# Patient Record
Sex: MALE | Race: WHITE | Employment: UNEMPLOYED | URBAN - METROPOLITAN AREA
[De-identification: names, ages, dates, MRNs, and addresses within clinical notes are randomized per-mention and may not be internally consistent; named-entity substitution may affect disease eponyms.]

---
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
## 2019-02-12 ENCOUNTER — OFFICE VISIT (OUTPATIENT)
Dept: INTERNAL MEDICINE CLINIC | Facility: CLINIC | Age: 19
End: 2019-02-12
Payer: COMMERCIAL

## 2019-02-12 VITALS
WEIGHT: 160 LBS | RESPIRATION RATE: 16 BRPM | SYSTOLIC BLOOD PRESSURE: 122 MMHG | HEART RATE: 82 BPM | HEIGHT: 70 IN | DIASTOLIC BLOOD PRESSURE: 78 MMHG | BODY MASS INDEX: 22.9 KG/M2

## 2019-02-12 DIAGNOSIS — F32.A DEPRESSION, UNSPECIFIED DEPRESSION TYPE: Primary | ICD-10-CM

## 2019-02-12 PROCEDURE — 99204 OFFICE O/P NEW MOD 45 MIN: CPT | Performed by: INTERNAL MEDICINE

## 2019-02-12 PROCEDURE — 99212 OFFICE O/P EST SF 10 MIN: CPT | Performed by: INTERNAL MEDICINE

## 2019-02-12 RX ORDER — ESCITALOPRAM OXALATE 5 MG/1
5 TABLET ORAL DAILY
Qty: 30 TABLET | Refills: 2 | Status: SHIPPED | OUTPATIENT
Start: 2019-02-12 | End: 2019-09-30

## 2019-02-12 NOTE — ASSESSMENT & PLAN NOTE
Mild to moderate persistent depression–waxing and waning over the past 1 year. He seems to be comfortable with his current educational choices. He does have a social [place] that he is comfortable with. No significant recreational drug uses.   Labs as dis

## 2019-02-12 NOTE — PATIENT INSTRUCTIONS
Problem List Items Addressed This Visit        Unprioritized    Depression - Primary     Mild to moderate persistent depression–waxing and waning over the past 1 year. He seems to be comfortable with his current educational choices.   He does have a social

## 2019-02-12 NOTE — PROGRESS NOTES
HPI:    Patient ID: Jaylen Hewitt is a 19year old male. New pt.   Establish care          Depression   Visit Type: initial  Onset of symptoms: more than 1 year ago  Progression since onset: gradually worsening  Patient presents with the following sympto Mouth/Throat: Oropharynx is clear and moist. No oropharyngeal exudate. Eyes: Conjunctivae and EOM are normal. Pupils are equal, round, and reactive to light. Neck: Normal range of motion. Neck supple. No JVD present. No thyromegaly present.    Cardiov Encounter      CBC W Differential W Platelet [E]      Comp Metabolic Panel (14) [E]      TSH [E]      Vitamin B12 [E]      Urinalysis, Routine [E]      Vitamin D, 25-Hydroxy [E]      Meds This Visit:  Requested Prescriptions     Signed Prescriptions Disp R

## 2019-09-30 ENCOUNTER — LAB ENCOUNTER (OUTPATIENT)
Dept: LAB | Age: 19
End: 2019-09-30
Attending: INTERNAL MEDICINE
Payer: COMMERCIAL

## 2019-09-30 ENCOUNTER — OFFICE VISIT (OUTPATIENT)
Dept: INTERNAL MEDICINE CLINIC | Facility: CLINIC | Age: 19
End: 2019-09-30
Payer: COMMERCIAL

## 2019-09-30 VITALS
BODY MASS INDEX: 22.62 KG/M2 | WEIGHT: 158 LBS | SYSTOLIC BLOOD PRESSURE: 131 MMHG | HEIGHT: 70 IN | HEART RATE: 56 BPM | RESPIRATION RATE: 16 BRPM | DIASTOLIC BLOOD PRESSURE: 85 MMHG

## 2019-09-30 DIAGNOSIS — R59.0 LAD (LYMPHADENOPATHY), INGUINAL: Primary | ICD-10-CM

## 2019-09-30 DIAGNOSIS — R59.0 LAD (LYMPHADENOPATHY), INGUINAL: ICD-10-CM

## 2019-09-30 PROCEDURE — 85652 RBC SED RATE AUTOMATED: CPT

## 2019-09-30 PROCEDURE — 85025 COMPLETE CBC W/AUTO DIFF WBC: CPT

## 2019-09-30 PROCEDURE — 87591 N.GONORRHOEAE DNA AMP PROB: CPT

## 2019-09-30 PROCEDURE — 81003 URINALYSIS AUTO W/O SCOPE: CPT

## 2019-09-30 PROCEDURE — 87086 URINE CULTURE/COLONY COUNT: CPT

## 2019-09-30 PROCEDURE — 99214 OFFICE O/P EST MOD 30 MIN: CPT | Performed by: INTERNAL MEDICINE

## 2019-09-30 PROCEDURE — 87491 CHLMYD TRACH DNA AMP PROBE: CPT

## 2019-09-30 PROCEDURE — 36415 COLL VENOUS BLD VENIPUNCTURE: CPT

## 2019-09-30 RX ORDER — DOXYCYCLINE HYCLATE 100 MG
100 TABLET ORAL 2 TIMES DAILY
Qty: 20 TABLET | Refills: 0 | Status: SHIPPED | OUTPATIENT
Start: 2019-09-30 | End: 2021-08-12

## 2019-09-30 NOTE — PROGRESS NOTES
HPI:    Patient ID: Odalis Cao is a 22year old male. Testicular Swelling   The patient's primary symptoms include pelvic pain and scrotal swelling (especially in the groin).  The patient's pertinent negatives include no genital injury, genital itchin index is 22.67 kg/m². PHYSICAL EXAM:   Physical Exam   Constitutional: He is oriented to person, place, and time. He appears well-developed and well-nourished. Cardiovascular: Normal rate, regular rhythm, normal heart sounds and intact distal pulses. avoid exposure to the sun. Advised to take the medication after a meal with a full glass of water. Follow-up evaluation in the next 10 days.          Relevant Medications    Doxycycline Hyclate 100 MG Oral Tab    Other Relevant Orders    CHLAMYDIA/GONOCOC

## 2019-09-30 NOTE — ASSESSMENT & PLAN NOTE
Left inguinal mildly tender lymphadenopathy noticed about 2 to 3 days back. No associated fevers or chills. No penile discharge or sexually transmitted diseases.   No significant testicular swelling noted however some palpable discomfort in the epididymis

## 2019-09-30 NOTE — PATIENT INSTRUCTIONS
Problem List Items Addressed This Visit        Unprioritized    LAD (lymphadenopathy), inguinal - Primary     Left inguinal mildly tender lymphadenopathy noticed about 2 to 3 days back. No associated fevers or chills.   No penile discharge or sexually rojas

## 2019-10-10 ENCOUNTER — OFFICE VISIT (OUTPATIENT)
Dept: INTERNAL MEDICINE CLINIC | Facility: CLINIC | Age: 19
End: 2019-10-10
Payer: COMMERCIAL

## 2019-10-10 ENCOUNTER — HOSPITAL ENCOUNTER (OUTPATIENT)
Dept: ULTRASOUND IMAGING | Facility: HOSPITAL | Age: 19
Discharge: HOME | End: 2019-10-10
Attending: INTERNAL MEDICINE
Payer: COMMERCIAL

## 2019-10-10 VITALS
WEIGHT: 158.38 LBS | RESPIRATION RATE: 20 BRPM | BODY MASS INDEX: 22.67 KG/M2 | SYSTOLIC BLOOD PRESSURE: 123 MMHG | TEMPERATURE: 99 F | DIASTOLIC BLOOD PRESSURE: 77 MMHG | HEART RATE: 63 BPM | HEIGHT: 70 IN

## 2019-10-10 DIAGNOSIS — R59.0 LAD (LYMPHADENOPATHY), INGUINAL: Primary | ICD-10-CM

## 2019-10-10 DIAGNOSIS — R59.0 LAD (LYMPHADENOPATHY), INGUINAL: ICD-10-CM

## 2019-10-10 DIAGNOSIS — Z23 NEED FOR VACCINATION: ICD-10-CM

## 2019-10-10 PROCEDURE — 99214 OFFICE O/P EST MOD 30 MIN: CPT | Performed by: INTERNAL MEDICINE

## 2019-10-10 PROCEDURE — 90686 IIV4 VACC NO PRSV 0.5 ML IM: CPT | Performed by: INTERNAL MEDICINE

## 2019-10-10 PROCEDURE — 90471 IMMUNIZATION ADMIN: CPT | Performed by: INTERNAL MEDICINE

## 2019-10-10 PROCEDURE — 76870 US EXAM SCROTUM: CPT | Performed by: INTERNAL MEDICINE

## 2019-10-10 PROCEDURE — 93975 VASCULAR STUDY: CPT | Performed by: INTERNAL MEDICINE

## 2019-10-10 NOTE — PROGRESS NOTES
HPI:    Patient ID: Henrik Dickinson is a 22year old male. FINDINGS:     RIGHT:  TESTICLE:        4.60 x 2.76 x 3.15 cm. Normal echogenicity. No masses. EPIDIDYMIS:     Normal size and echogenicity. OTHER:             Small hydrocele. No varicocele. dysuria, flank pain, joint pain, painful intercourse, sore throat or urgency. Associated symptoms comments: Sexually active, single partner. No history of STDs. Labs negative for STDs . The testicular pain affects the left testicle.  There is swelling i submandibular, no tonsillar, no preauricular, no posterior auricular and no occipital adenopathy present. He has no cervical adenopathy. He has no axillary adenopathy.         Right: No inguinal, no supraclavicular and no epitrochlear adenopathy pre

## 2019-10-10 NOTE — PATIENT INSTRUCTIONS
Problem List Items Addressed This Visit        Unprioritized    LAD (lymphadenopathy), inguinal - Primary     Left inguinal lymph node about 1.23 cm per the ultrasound. Currently no significant pain. Swelling is almost completely resolved.   He does have

## 2019-10-10 NOTE — ASSESSMENT & PLAN NOTE
Left inguinal lymph node about 1.23 cm per the ultrasound. Currently no significant pain. Swelling is almost completely resolved. He does have small epididymal cysts as well as small bilateral hydroceles.   Urine cultures, STD tests have all been negativ

## 2019-11-28 ENCOUNTER — HOSPITAL ENCOUNTER (EMERGENCY)
Facility: HOSPITAL | Age: 19
Discharge: HOME | End: 2019-11-28
Attending: EMERGENCY MEDICINE
Payer: COMMERCIAL

## 2019-11-28 ENCOUNTER — APPOINTMENT (OUTPATIENT)
Dept: GENERAL RADIOLOGY | Facility: HOSPITAL | Age: 19
End: 2019-11-28
Attending: EMERGENCY MEDICINE
Payer: COMMERCIAL

## 2019-11-28 VITALS
WEIGHT: 155 LBS | OXYGEN SATURATION: 98 % | TEMPERATURE: 99 F | RESPIRATION RATE: 18 BRPM | DIASTOLIC BLOOD PRESSURE: 88 MMHG | HEIGHT: 70 IN | SYSTOLIC BLOOD PRESSURE: 135 MMHG | HEART RATE: 98 BPM | BODY MASS INDEX: 22.19 KG/M2

## 2019-11-28 DIAGNOSIS — S00.33XA CONTUSION OF NOSE, INITIAL ENCOUNTER: Primary | ICD-10-CM

## 2019-11-28 PROCEDURE — 99283 EMERGENCY DEPT VISIT LOW MDM: CPT

## 2019-11-28 PROCEDURE — 70160 X-RAY EXAM OF NASAL BONES: CPT | Performed by: EMERGENCY MEDICINE

## 2019-11-28 RX ORDER — IBUPROFEN 600 MG/1
600 TABLET ORAL ONCE
Status: COMPLETED | OUTPATIENT
Start: 2019-11-28 | End: 2019-11-28

## 2019-11-28 NOTE — ED INITIAL ASSESSMENT (HPI)
States was hit on nose with another player's arm while playing football this AM, denies LOC. Denies epistaxis.

## 2019-11-30 NOTE — ED PROVIDER NOTES
Patient Seen in: [facility] Emergency Department      History   Patient presents with:  Trauma (cardiovascular, musculoskeletal)    Stated Complaint: Arm to nose, no blood     HPI    23-year-old male presents for evaluation of facial trauma.   Sandra Normocephalic. Comments: Swelling to nasal bridge, no septal hematoma bilaterally  Eyes:      Conjunctiva/sclera: Conjunctivae normal.   Neck:      Musculoskeletal: Normal range of motion and neck supple.    Cardiovascular:      Rate and Rhythm: Normal all ED vitals. MDM     Discussed imaging results with the patient as well as recommendations for supportive care. Patient has seen ENT for previous nasal fracture, recommend outpatient follow-up with any healing concerns.   Patient and mom verbali

## 2020-01-19 ENCOUNTER — HOSPITAL ENCOUNTER (OUTPATIENT)
Age: 20
Discharge: HOME | End: 2020-01-19
Payer: COMMERCIAL

## 2020-01-19 ENCOUNTER — APPOINTMENT (OUTPATIENT)
Dept: GENERAL RADIOLOGY | Age: 20
End: 2020-01-19
Attending: NURSE PRACTITIONER
Payer: COMMERCIAL

## 2020-01-19 VITALS
BODY MASS INDEX: 22.19 KG/M2 | TEMPERATURE: 98 F | HEART RATE: 62 BPM | OXYGEN SATURATION: 100 % | RESPIRATION RATE: 16 BRPM | DIASTOLIC BLOOD PRESSURE: 74 MMHG | HEIGHT: 70 IN | SYSTOLIC BLOOD PRESSURE: 135 MMHG | WEIGHT: 155 LBS

## 2020-01-19 DIAGNOSIS — S90.122A CONTUSION OF LESSER TOE OF LEFT FOOT WITHOUT DAMAGE TO NAIL, INITIAL ENCOUNTER: Primary | ICD-10-CM

## 2020-01-19 PROCEDURE — 99213 OFFICE O/P EST LOW 20 MIN: CPT

## 2020-01-19 PROCEDURE — 73660 X-RAY EXAM OF TOE(S): CPT | Performed by: NURSE PRACTITIONER

## 2020-01-19 NOTE — ED INITIAL ASSESSMENT (HPI)
PATIENT ARRIVED AMBULATORY TO ROOM C/O PAIN TO THE 2ND DIGIT ON THE LEFT FOOT. PATIENT STATES \"I WAS DRINKING WITH MY FRIENDS YESTERDAY AND I HIT MY TOE ON SOMETHING BUT I DON'T REMEMBER WHAT HAPPENED\" SLIGHT SWELLING TO THE TOE.

## 2020-01-19 NOTE — ED PROVIDER NOTES
Patient presents with: Toe Pain      HPI:     Barbara Long is a 22year old male with no significant past medical history presents with chief complaint of swelling, ecchymosis and pain to the second digit of the left foot.   Patient states he was at his f TOE(S) (MIN 2 VIEWS), LEFT 2ND (CPT=73660) Once          Order Comments:              L foot pain  PATIENT ARRIVED AMBULATORY TO ROOM C/O PAIN TO THE 2ND DIGIT ON THE LEFT               FOOT.  PATIENT STATES \"I WAS DRINKING WITH MY FRIENDS YESTERDAY AND I

## 2020-02-12 ENCOUNTER — PATIENT MESSAGE (OUTPATIENT)
Dept: INTERNAL MEDICINE CLINIC | Facility: CLINIC | Age: 20
End: 2020-02-12

## 2020-02-13 ENCOUNTER — TELEPHONE (OUTPATIENT)
Dept: OTHER | Age: 20
End: 2020-02-13

## 2020-02-13 NOTE — TELEPHONE ENCOUNTER
From: Ori Santiago  To: Patrick Loya MD  Sent: 2/12/2020 1:59 PM CST  Subject: Prescription Question    I had been prescribed Lexapro 5mg and I am in need of additional medication as the 5mg did not make a significant difference for me.  I would like to a

## 2020-02-13 NOTE — TELEPHONE ENCOUNTER
LMTCB and [employer] message sent as well. Please see e-mail below, need to triage.               Bentley Drew   to Edin Gaviria MD           1:59 PM   I had been prescribed Lexapro 5mg and I am in need of additional medication as the 5mg did not make a si

## 2020-02-24 ENCOUNTER — HOSPITAL ENCOUNTER (EMERGENCY)
Facility: HOSPITAL | Age: 20
Discharge: HOME | End: 2020-02-24
Attending: EMERGENCY MEDICINE
Payer: COMMERCIAL

## 2020-02-24 VITALS
RESPIRATION RATE: 18 BRPM | HEART RATE: 91 BPM | DIASTOLIC BLOOD PRESSURE: 88 MMHG | OXYGEN SATURATION: 99 % | SYSTOLIC BLOOD PRESSURE: 140 MMHG | TEMPERATURE: 98 F

## 2020-02-24 DIAGNOSIS — F32.A DEPRESSION, UNSPECIFIED DEPRESSION TYPE: Primary | ICD-10-CM

## 2020-02-24 LAB
AMPHET UR QL SCN: NEGATIVE
ANION GAP SERPL CALC-SCNC: 6 MMOL/L (ref 0–18)
BASOPHILS # BLD AUTO: 0.03 X10(3) UL (ref 0–0.2)
BASOPHILS NFR BLD AUTO: 0.6 %
BILIRUB UR QL: NEGATIVE
BUN BLD-MCNC: 16 MG/DL (ref 7–18)
BUN/CREAT SERPL: 13.8 (ref 10–20)
CALCIUM BLD-MCNC: 9.7 MG/DL (ref 8.5–10.1)
CANNABINOIDS UR QL SCN: NEGATIVE
CHLORIDE SERPL-SCNC: 104 MMOL/L (ref 98–112)
CLARITY UR: CLEAR
CO2 SERPL-SCNC: 28 MMOL/L (ref 21–32)
COCAINE UR QL: NEGATIVE
COLOR UR: YELLOW
CREAT BLD-MCNC: 1.16 MG/DL (ref 0.7–1.3)
DEPRECATED RDW RBC AUTO: 36.5 FL (ref 35.1–46.3)
EOSINOPHIL # BLD AUTO: 0.07 X10(3) UL (ref 0–0.7)
EOSINOPHIL NFR BLD AUTO: 1.3 %
ERYTHROCYTE [DISTWIDTH] IN BLOOD BY AUTOMATED COUNT: 11.7 % (ref 11–15)
ETHANOL SERPL-MCNC: <3 MG/DL (ref ?–3)
GLUCOSE BLD-MCNC: 88 MG/DL (ref 70–99)
GLUCOSE UR-MCNC: NEGATIVE MG/DL
HCT VFR BLD AUTO: 45.9 % (ref 39–53)
HGB BLD-MCNC: 16 G/DL (ref 13–17.5)
HYALINE CASTS #/AREA URNS AUTO: 8 /LPF
IMM GRANULOCYTES # BLD AUTO: 0.02 X10(3) UL (ref 0–1)
IMM GRANULOCYTES NFR BLD: 0.4 %
KETONES UR-MCNC: 80 MG/DL
LEUKOCYTE ESTERASE UR QL STRIP.AUTO: NEGATIVE
LYMPHOCYTES # BLD AUTO: 2.45 X10(3) UL (ref 1–4)
LYMPHOCYTES NFR BLD AUTO: 45 %
MCH RBC QN AUTO: 29.9 PG (ref 26–34)
MCHC RBC AUTO-ENTMCNC: 34.9 G/DL (ref 31–37)
MCV RBC AUTO: 85.8 FL (ref 80–100)
MDMA UR QL SCN: NEGATIVE
MONOCYTES # BLD AUTO: 0.37 X10(3) UL (ref 0.1–1)
MONOCYTES NFR BLD AUTO: 6.8 %
NEUTROPHILS # BLD AUTO: 2.5 X10 (3) UL (ref 1.5–7.7)
NEUTROPHILS # BLD AUTO: 2.5 X10(3) UL (ref 1.5–7.7)
NEUTROPHILS NFR BLD AUTO: 45.9 %
NITRITE UR QL STRIP.AUTO: NEGATIVE
OPIATES UR QL SCN: NEGATIVE
OSMOLALITY SERPL CALC.SUM OF ELEC: 287 MOSM/KG (ref 275–295)
OXYCODONE UR QL SCN: NEGATIVE
PCP UR QL SCN: NEGATIVE
PH UR: 5 [PH] (ref 5–8)
PLATELET # BLD AUTO: 267 10(3)UL (ref 150–450)
POTASSIUM SERPL-SCNC: 3.1 MMOL/L (ref 3.5–5.1)
PROT UR-MCNC: 30 MG/DL
RBC # BLD AUTO: 5.35 X10(6)UL (ref 4.3–5.7)
RBC #/AREA URNS AUTO: 3 /HPF
SODIUM SERPL-SCNC: 138 MMOL/L (ref 136–145)
SP GR UR STRIP: 1.02 (ref 1–1.03)
UROBILINOGEN UR STRIP-ACNC: <2
WBC # BLD AUTO: 5.4 X10(3) UL (ref 4–11)
WBC #/AREA URNS AUTO: <1 /HPF

## 2020-02-24 PROCEDURE — 80320 DRUG SCREEN QUANTALCOHOLS: CPT | Performed by: EMERGENCY MEDICINE

## 2020-02-24 PROCEDURE — 36415 COLL VENOUS BLD VENIPUNCTURE: CPT

## 2020-02-24 PROCEDURE — 99285 EMERGENCY DEPT VISIT HI MDM: CPT

## 2020-02-24 PROCEDURE — 80307 DRUG TEST PRSMV CHEM ANLYZR: CPT | Performed by: EMERGENCY MEDICINE

## 2020-02-24 PROCEDURE — 80048 BASIC METABOLIC PNL TOTAL CA: CPT | Performed by: EMERGENCY MEDICINE

## 2020-02-24 PROCEDURE — 81001 URINALYSIS AUTO W/SCOPE: CPT | Performed by: EMERGENCY MEDICINE

## 2020-02-24 PROCEDURE — 85025 COMPLETE CBC W/AUTO DIFF WBC: CPT | Performed by: EMERGENCY MEDICINE

## 2020-02-24 RX ORDER — POTASSIUM CHLORIDE 20 MEQ/1
40 TABLET, EXTENDED RELEASE ORAL ONCE
Status: COMPLETED | OUTPATIENT
Start: 2020-02-24 | End: 2020-02-24

## 2020-02-24 NOTE — ED INITIAL ASSESSMENT (HPI)
Pt arrived via medics to rm 36 for complaint of suicidal ideation. States he was prescribed lexapro a few months ago and has not been taking them, states was walking over bridge last week and had thoughts of harming himself.   States having hard time getti

## 2020-02-24 NOTE — ED NOTES
Patient safe to DC home per MD. Jocelynn Jhaveri to dress self. DC teaching done, instructions reviewed with patient including when and how to follow up with healthcare providers and when to seek emergency care. The patient verbalizes understanding.  Patient ambulatory

## 2020-02-24 NOTE — BH LEVEL OF CARE ASSESSMENT
Level of Care Assessment Note    General Questions  Why are you here?: Over the past couple of years I've been feeling down and unmotivated, worthless, like I'm just not here. I can't sleep. When I do fall asleep I don't want to take up.  I feel like I don' anything, or prepared to do anything to end your life? (lifetime): No  Score -  OV: 7 - High Risk   Describe : Pt had some thoughts looking of a bridge onto the highway last week.  Pt considered jumping, but states he did not start to climb the fense or b worthlessness; Feelings of hopelessess; Loss of interest;Increased irritability; Impaired concentration; Feelings of helplessness; Appetite change;Sleep disturbance  Depression Description: Pt has been having increasing depressive sxs for some time, particularl dominates your life?: No  SCOFF Score: 0                                                                                                        Current/Previous MH/CD Providers  Hospitalizations, Placements, Therapy, Detox: (Lexapro from PCP for about 1 mo Problem: 1.5 years ago  Consequences: can't sleep                                    Functional Impairment  Currently Attending School: No(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 Lopez Ast  Level of Care Recommendation: Intensive

## 2020-02-24 NOTE — ED PROVIDER NOTES
Patient Seen in: [facility]      History   Patient presents with:  Eval-P    Stated Complaint: psych eval    HPI    78-year-old male with history of depression presents with complaints of increased depressive thoughts along wi month    Alcohol use: Yes      Comment: 4x/month    Drug use: No             Review of Systems    Positive for stated complaint: psych eval  Other systems are as noted in HPI. Constitutional and vital signs reviewed.       All other systems reviewed and ne ---------                               -----------         ------                     CBC W/ DIFFERENTIAL[[id number]]                              Final result                 Please view results for these tests on the individual orders.    CBC W/ DIFFERE

## 2020-02-24 NOTE — TELEPHONE ENCOUNTER
The patient called to stated the Lexapro 5 mg is not working. He tried 10 mg before and it also did not work. She stated he is unmotivated, can't sleep. He has been talking to his parents on his feelings.      Upon talking to the patient he has had tho

## 2020-02-25 NOTE — TELEPHONE ENCOUNTER
Per pt's chart, he was brought to [facility] ED and per ER notes was, \". ..evaluated by  who has arranged for the patient to enter an intensive outpatient program.  He is advised to return if he has any further thoughts of hurting himself or othe

## 2021-03-24 ENCOUNTER — APPOINTMENT (OUTPATIENT)
Dept: OTHER | Facility: HOSPITAL | Age: 21
End: 2021-03-24
Attending: EMERGENCY MEDICINE

## 2021-08-12 ENCOUNTER — OFFICE VISIT (OUTPATIENT)
Dept: FAMILY MEDICINE CLINIC | Facility: CLINIC | Age: 21
End: 2021-08-12
Payer: COMMERCIAL

## 2021-08-12 VITALS
DIASTOLIC BLOOD PRESSURE: 78 MMHG | HEART RATE: 90 BPM | OXYGEN SATURATION: 98 % | RESPIRATION RATE: 14 BRPM | TEMPERATURE: 99 F | HEIGHT: 70.5 IN | SYSTOLIC BLOOD PRESSURE: 118 MMHG | WEIGHT: 166.19 LBS | BODY MASS INDEX: 23.53 KG/M2

## 2021-08-12 DIAGNOSIS — Z11.52 ENCOUNTER FOR SCREENING FOR COVID-19: ICD-10-CM

## 2021-08-12 DIAGNOSIS — J02.9 SORE THROAT: Primary | ICD-10-CM

## 2021-08-12 LAB
CONTROL LINE PRESENT WITH A CLEAR BACKGROUND (YES/NO): YES YES/NO
KIT LOT #: NORMAL NUMERIC
STREP GRP A CUL-SCR: NEGATIVE

## 2021-08-12 PROCEDURE — 3074F SYST BP LT 130 MM HG: CPT | Performed by: PHYSICIAN ASSISTANT

## 2021-08-12 PROCEDURE — 3078F DIAST BP <80 MM HG: CPT | Performed by: PHYSICIAN ASSISTANT

## 2021-08-12 PROCEDURE — 99202 OFFICE O/P NEW SF 15 MIN: CPT | Performed by: PHYSICIAN ASSISTANT

## 2021-08-12 PROCEDURE — 3008F BODY MASS INDEX DOCD: CPT | Performed by: PHYSICIAN ASSISTANT

## 2021-08-12 PROCEDURE — 87880 STREP A ASSAY W/OPTIC: CPT | Performed by: PHYSICIAN ASSISTANT

## 2021-08-12 NOTE — PROGRESS NOTES
CHIEF COMPLAINT:   Patient presents with:  Sore Throat: Also have a slight cough - Entered by patient      HPI:   Jaylen Hewitt is a 19year old male who presents with symptoms as described below for about 24 hours-- sx started 8/11.        • Fever:     Yes Quit date: 3/1/2019    Alcohol use: Yes      Comment: 4x/month    Drug use: No        REVIEW OF SYSTEMS:   GENERAL: good appetite, drinking fluids normally  SKIN: Denies rash or other lesions  HEENT: See HPI  LUNGS: See HPI  CARDIOVASCULAR: denies palpitat Encouraged patient to call clinic or covid hotline if any questions or concerns that may arise. Discussed length of time to obtain results as well as obtaining results on mychart. Encouraged mychart sign up if not already completed.      Advised to self prevent possible medicine interactions, let the pharmacist know what medicines you take. To decrease symptoms:   · Ease pain with anesthetic sprays. Aspirin or an aspirin substitute also helps. Remember, never give aspirin to anyone 25 or younger.  Don't ta arise about the new strain of coronavirus that causes COVID-19, [facility] is here to provide community members reliable answers to any questions they may have. Please review the entirety of this informational document.   It includes informat until 14 days after exposure. • If you have symptoms, immediately self-isolate and contact your local public health authority or healthcare provider.   • Wear a mask, stay at least 6 feet from others, wash your hands, avoid crowds, and take other steps to breathing, or unrelenting fevers greater than 100.4 degrees Fahrenheit, you should contact your health care provider before seeking further care. Process measures to keep everyone safe in this difficult time are changing frequently.  Your healthcare provid discharge to arrange for a telehealth follow-up.  CDC does not recommend repeat testing after a positive test.  Convalescent Plasma Donation Program  [facility], in conjunction with Emilio Silverman., is looking for patients who have recovered from [place].cy  http://www.[facility].[place].gov/topics-services/diseases-and-conditions/diseases-a-z-list/coronavirus  https://www.cdc.gov/coronavirus/2019-nc (n.d.). Retrieved May 11, 2021, from [employer].  What it means to be A Coronavirus \"long-hauler\". (2021, January 28). Retrieved March 17, 2021, from https://[facility]. [facility].org/what-it-means-to

## 2021-08-12 NOTE — PATIENT INSTRUCTIONS
Self-Care for Sore Throats  Sore throats happen for many reasons, such as colds, allergies, cigarette smoke, air pollution, and infections caused by viruses or bacteria. In any case, your throat becomes red and sore.  Your goal for self-care is to reduce allergy-causing substances, such as pollen and mold. · Wash your hands often when you’re around someone with a sore throat or cold. This will keep viruses or bacteria from spreading. · Limit outdoor time when air pollution is bad.   · Don’t strain your vo you have had close contact recently (starting 2 days before you first had symptoms). What counts as close contact?     * You were within 6 feet of someone who has COVID-19 for at least 15 minutes  * You provided care at home to someone who is sick with Monitor your symptoms carefully. If your symptoms get worse, call your healthcare provider immediately. 3. Get rest and stay hydrated.    4. If you have a medical appointment, call the healthcare provider ahead of time and tell them that you have or may ha fever without the use of fever-reducing medications; and  · Improvement in respiratory symptoms (e.g., cough, shortness of breath); and  · At least 10 days have passed since symptoms first appeared OR if asymptomatic patient or date of symptom onset is unc [facility] (a large blood research institute in [address] [place] [address] and one of [facility]’s blood product suppliers) is coordinating plasma donations.     If you would be interested in donating your plasma to help treat others diagnosed with the virus, please con Sleeping difficulties   Anxiety or depression Loss of taste or smell   Chest pain  Palpitations Light headedness  Muscle Pain   Increased Heart Rate Tingling, numbness, or burning sensation   Shortness of breath Hair loss   GI disturbances  Fever  Swelling

## 2021-08-13 LAB — SARS-COV-2 RNA RESP QL NAA+PROBE: NOT DETECTED

## 2021-08-14 ENCOUNTER — OFFICE VISIT (OUTPATIENT)
Dept: FAMILY MEDICINE CLINIC | Facility: CLINIC | Age: 21
End: 2021-08-14
Payer: COMMERCIAL

## 2021-08-14 VITALS
HEIGHT: 70 IN | OXYGEN SATURATION: 98 % | BODY MASS INDEX: 23.77 KG/M2 | SYSTOLIC BLOOD PRESSURE: 137 MMHG | RESPIRATION RATE: 15 BRPM | WEIGHT: 166 LBS | TEMPERATURE: 99 F | HEART RATE: 84 BPM | DIASTOLIC BLOOD PRESSURE: 90 MMHG

## 2021-08-14 DIAGNOSIS — J06.9 VIRAL URI WITH COUGH: Primary | ICD-10-CM

## 2021-08-14 PROCEDURE — 3008F BODY MASS INDEX DOCD: CPT | Performed by: NURSE PRACTITIONER

## 2021-08-14 PROCEDURE — 99213 OFFICE O/P EST LOW 20 MIN: CPT | Performed by: NURSE PRACTITIONER

## 2021-08-14 PROCEDURE — 3075F SYST BP GE 130 - 139MM HG: CPT | Performed by: NURSE PRACTITIONER

## 2021-08-14 PROCEDURE — 3080F DIAST BP >= 90 MM HG: CPT | Performed by: NURSE PRACTITIONER

## 2021-08-14 NOTE — PROGRESS NOTES
CHIEF COMPLAINT:   Patient presents with:  Covid-19 Test: Follow up visit. I was seen 2 days ago and have had additional symptoms. - Entered by patient      HPI:   Amy Fajardo is a 19year old male who presents for upper respiratory symptoms for  4 days. 3/1/2019    Alcohol use: Yes      Comment: 4x/month    Drug use: No        REVIEW OF SYSTEMS:   GENERAL: feels well otherwise, normal appetite  SKIN: no rashes or abnormal skin lesions  HEENT: See HPI  LUNGS: denies shortness of breath or wheezing, See HPI fever or worsening breathing. To ER if ever dyspnea, chest pain, SOB.  - Pt verbalizes understanding and is agreeable w/ plan.     Meds & Refills for this Visit:  Requested Prescriptions      No prescriptions requested or ordered in this encounter the length of time you’re sick, but they may be helpful for the following symptoms: cough, sore throat, and nasal and sinus congestion.  If you take prescription medicines, ask your healthcare provider or pharmacist which over-the-counter medicines are safe

## 2021-08-15 LAB — SARS-COV-2 RNA RESP QL NAA+PROBE: NOT DETECTED

## 2022-01-06 ENCOUNTER — TELEMEDICINE (OUTPATIENT)
Dept: INTERNAL MEDICINE CLINIC | Facility: CLINIC | Age: 22
End: 2022-01-06

## 2022-01-06 DIAGNOSIS — U07.1 COVID-19: Primary | ICD-10-CM

## 2022-01-06 PROCEDURE — 99213 OFFICE O/P EST LOW 20 MIN: CPT | Performed by: NURSE PRACTITIONER

## 2022-01-06 RX ORDER — AZITHROMYCIN 250 MG/1
TABLET, FILM COATED ORAL
Qty: 6 TABLET | Refills: 0 | Status: SHIPPED | OUTPATIENT
Start: 2022-01-06 | End: 2022-01-11

## 2022-01-06 NOTE — ASSESSMENT & PLAN NOTE
Patient with COVID-19 symptoms that are improving but he has worsening throat pain and left ear pain.     Plan  1) Please follow strict handwashing  2) Isolate 5 days  3) Multi-vitamin with zinc 25 mg daily  4) Vitamin C 1000 mg  daily  5) Use separate bath

## 2022-01-06 NOTE — PROGRESS NOTES
HPI:    Patient ID: Carlos Stephen is a 19year old male. Please note that the following visit was completed using two-way, real-time interactive audio and video communication.   This has been done in good devin to provide continuity of care in the best int Comment: 4x/month      Drug use: No    Other Topics      Concerns:        Caffeine Concern: No         Review of Systems   Constitutional: Positive for fatigue. Negative for chills and fever.    HENT: Negative for congestion, ear pain, hearing loss, sinus Content: Thought content normal.         Judgment: Judgment normal.       There were no vitals taken for this visit.   Wt Readings from Last 2 Encounters:  08/14/21 : 166 lb (75.3 kg)  08/12/21 : 166 lb 3.2 oz (75.4 kg)    There is no height or weight on fi daily for 1 day, THEN 1 tablet (250 mg total) daily for 4 days.        Imaging & Referrals:  None         Nelson Potts, APRN

## 2022-03-18 ENCOUNTER — APPOINTMENT (OUTPATIENT)
Dept: OTHER | Facility: HOSPITAL | Age: 22
End: 2022-03-18
Attending: EMERGENCY MEDICINE

## 2023-01-04 ENCOUNTER — HOSPITAL ENCOUNTER (EMERGENCY)
Facility: HOSPITAL | Age: 23
Discharge: HOME | End: 2023-01-04
Attending: EMERGENCY MEDICINE
Payer: COMMERCIAL

## 2023-01-04 ENCOUNTER — APPOINTMENT (OUTPATIENT)
Dept: GENERAL RADIOLOGY | Facility: HOSPITAL | Age: 23
End: 2023-01-04
Attending: EMERGENCY MEDICINE
Payer: COMMERCIAL

## 2023-01-04 VITALS
SYSTOLIC BLOOD PRESSURE: 141 MMHG | OXYGEN SATURATION: 99 % | DIASTOLIC BLOOD PRESSURE: 83 MMHG | TEMPERATURE: 99 F | RESPIRATION RATE: 18 BRPM | HEART RATE: 77 BPM

## 2023-01-04 DIAGNOSIS — R00.2 PALPITATIONS: Primary | ICD-10-CM

## 2023-01-04 LAB
ALBUMIN SERPL-MCNC: 4.7 G/DL (ref 3.4–5)
ALP LIVER SERPL-CCNC: 75 U/L
ALT SERPL-CCNC: 34 U/L
ANION GAP SERPL CALC-SCNC: 4 MMOL/L (ref 0–18)
AST SERPL-CCNC: 15 U/L (ref 15–37)
BASOPHILS # BLD AUTO: 0.03 X10(3) UL (ref 0–0.2)
BASOPHILS NFR BLD AUTO: 0.4 %
BILIRUB DIRECT SERPL-MCNC: 0.1 MG/DL (ref 0–0.2)
BILIRUB SERPL-MCNC: 0.5 MG/DL (ref 0.1–2)
BUN BLD-MCNC: 16 MG/DL (ref 7–18)
BUN/CREAT SERPL: 15.2 (ref 10–20)
CALCIUM BLD-MCNC: 9.6 MG/DL (ref 8.5–10.1)
CHLORIDE SERPL-SCNC: 104 MMOL/L (ref 98–112)
CO2 SERPL-SCNC: 29 MMOL/L (ref 21–32)
CREAT BLD-MCNC: 1.05 MG/DL
DEPRECATED RDW RBC AUTO: 35.8 FL (ref 35.1–46.3)
EOSINOPHIL # BLD AUTO: 0.06 X10(3) UL (ref 0–0.7)
EOSINOPHIL NFR BLD AUTO: 0.7 %
ERYTHROCYTE [DISTWIDTH] IN BLOOD BY AUTOMATED COUNT: 11.3 % (ref 11–15)
GFR SERPLBLD BASED ON 1.73 SQ M-ARVRAT: 102 ML/MIN/1.73M2 (ref 60–?)
GLUCOSE BLD-MCNC: 95 MG/DL (ref 70–99)
GLUCOSE BLDC GLUCOMTR-MCNC: 108 MG/DL (ref 70–99)
HCT VFR BLD AUTO: 47.5 %
HGB BLD-MCNC: 16.4 G/DL
IMM GRANULOCYTES # BLD AUTO: 0.04 X10(3) UL (ref 0–1)
IMM GRANULOCYTES NFR BLD: 0.5 %
LYMPHOCYTES # BLD AUTO: 2.17 X10(3) UL (ref 1–4)
LYMPHOCYTES NFR BLD AUTO: 26.4 %
MAGNESIUM SERPL-MCNC: 2.3 MG/DL (ref 1.6–2.6)
MCH RBC QN AUTO: 29.7 PG (ref 26–34)
MCHC RBC AUTO-ENTMCNC: 34.5 G/DL (ref 31–37)
MCV RBC AUTO: 86.1 FL
MONOCYTES # BLD AUTO: 0.51 X10(3) UL (ref 0.1–1)
MONOCYTES NFR BLD AUTO: 6.2 %
NEUTROPHILS # BLD AUTO: 5.42 X10 (3) UL (ref 1.5–7.7)
NEUTROPHILS # BLD AUTO: 5.42 X10(3) UL (ref 1.5–7.7)
NEUTROPHILS NFR BLD AUTO: 65.8 %
OSMOLALITY SERPL CALC.SUM OF ELEC: 285 MOSM/KG (ref 275–295)
PLATELET # BLD AUTO: 302 10(3)UL (ref 150–450)
POTASSIUM SERPL-SCNC: 3.7 MMOL/L (ref 3.5–5.1)
PROT SERPL-MCNC: 8.5 G/DL (ref 6.4–8.2)
RBC # BLD AUTO: 5.52 X10(6)UL
SODIUM SERPL-SCNC: 137 MMOL/L (ref 136–145)
TROPONIN I HIGH SENSITIVITY: 6 NG/L
TSI SER-ACNC: 1.83 MIU/ML (ref 0.36–3.74)
WBC # BLD AUTO: 8.2 X10(3) UL (ref 4–11)

## 2023-01-04 PROCEDURE — 93010 ELECTROCARDIOGRAM REPORT: CPT

## 2023-01-04 PROCEDURE — 83735 ASSAY OF MAGNESIUM: CPT | Performed by: EMERGENCY MEDICINE

## 2023-01-04 PROCEDURE — 84443 ASSAY THYROID STIM HORMONE: CPT | Performed by: EMERGENCY MEDICINE

## 2023-01-04 PROCEDURE — 93005 ELECTROCARDIOGRAM TRACING: CPT

## 2023-01-04 PROCEDURE — 99284 EMERGENCY DEPT VISIT MOD MDM: CPT

## 2023-01-04 PROCEDURE — 84484 ASSAY OF TROPONIN QUANT: CPT | Performed by: EMERGENCY MEDICINE

## 2023-01-04 PROCEDURE — 85025 COMPLETE CBC W/AUTO DIFF WBC: CPT | Performed by: EMERGENCY MEDICINE

## 2023-01-04 PROCEDURE — 71045 X-RAY EXAM CHEST 1 VIEW: CPT | Performed by: EMERGENCY MEDICINE

## 2023-01-04 PROCEDURE — 36415 COLL VENOUS BLD VENIPUNCTURE: CPT

## 2023-01-04 PROCEDURE — 80076 HEPATIC FUNCTION PANEL: CPT | Performed by: EMERGENCY MEDICINE

## 2023-01-04 PROCEDURE — 80048 BASIC METABOLIC PNL TOTAL CA: CPT | Performed by: EMERGENCY MEDICINE

## 2023-01-04 PROCEDURE — 82962 GLUCOSE BLOOD TEST: CPT

## 2023-01-04 NOTE — ED QUICK NOTES
Patient approved for discharge by ED provider. Instructed to follow up with cardiology. Given information on when to return to ED. All questions addressed and reviewed. Verbalizes understanding. Left ED in stable condition.

## 2023-01-04 NOTE — ED INITIAL ASSESSMENT (HPI)
Patient reports having lightheadness 2 times today/felt like he was going to \"pass out,\" but never did. Hx of palpitations. Patient saw a cardiologist/holter monitor placement in November and everything was normal. Denies chest pain.

## 2023-01-04 NOTE — DISCHARGE INSTRUCTIONS
Return to the emergency department if you develop severe and persistent chest pain, difficulty breathing, dizziness, leg swelling, or if you are coughing up blood, as these can be signs of a medical emergency. Please call your doctor for a follow-up appointment in 1 to 3 days to determine the need for further testing.

## 2023-01-04 NOTE — ED QUICK NOTES
Patient to ED with mom for complaints of intermittent cp and palpitations. Seen for same recently and had holter monitor for 14 days-pcp reports no acute findings. Still awaiting follow up w cardiology. Pt denies correlation with CP and palpitations-states both are random. Denies cardiac history. Covid xs 2, most recent 2 months prior to palpitations starting. VSS    Placed on cardiac monitor. PIV inserted. Labs drawn and sent. Call light within reach.

## 2023-01-05 LAB
ATRIAL RATE: 92 BPM
P AXIS: 80 DEGREES
P-R INTERVAL: 162 MS
Q-T INTERVAL: 328 MS
QRS DURATION: 72 MS
QTC CALCULATION (BEZET): 405 MS
R AXIS: 80 DEGREES
T AXIS: 63 DEGREES
VENTRICULAR RATE: 92 BPM

## (undated) NOTE — ED AVS SNAPSHOT
Mirian Mohan   MRN: [id number]    Department:  [facility] Emergency Department   Date of Visit:  11/20/2017           Disclosure     Insurance plans vary and the physician(s) referred by the ER may not be covered by your plan.  Please contact y CARE PHYSICIAN AT ONCE OR RETURN IMMEDIATELY TO THE EMERGENCY DEPARTMENT. If you have been prescribed any medication(s), please fill your prescription right away and begin taking the medication(s) as directed.   If you believe that any of the medications

## (undated) NOTE — ED AVS SNAPSHOT
Vanessa Calero   MRN: [id number]    Department:  [facility] Emergency Department   Date of Visit:  2/24/2020           Disclosure     Insurance plans vary and the physician(s) referred by the ER may not be covered by your plan.  Please contact yo CARE PHYSICIAN AT ONCE OR RETURN IMMEDIATELY TO THE EMERGENCY DEPARTMENT. If you have been prescribed any medication(s), please fill your prescription right away and begin taking the medication(s) as directed.   If you believe that any of the medications

## (undated) NOTE — LETTER
11/26/19        Kassy Lawton      Dear Ernie Evans records indicate that you have outstanding lab work and or testing that was ordered for you and has not yet been completed:  Orders Placed This Encounter      CB

## (undated) NOTE — ED AVS SNAPSHOT
Stephanie Pike   MRN: [id number]    Department:  [facility] Emergency Department   Date of Visit:  11/28/2019           Disclosure     Insurance plans vary and the physician(s) referred by the ER may not be covered by your plan.  Please contact y CARE PHYSICIAN AT ONCE OR RETURN IMMEDIATELY TO THE EMERGENCY DEPARTMENT. If you have been prescribed any medication(s), please fill your prescription right away and begin taking the medication(s) as directed.   If you believe that any of the medications

## (undated) NOTE — LETTER
November 20, 2017    Patient: Lupis Weber   Date of Visit: 11/20/2017       To Whom It May Concern:    Xena Noonan was seen and treated in our emergency department on 11/20/2017. He can return to work.     If you have any questions or concerns, please